# Patient Record
Sex: MALE | Race: WHITE | NOT HISPANIC OR LATINO | Employment: FULL TIME | URBAN - METROPOLITAN AREA
[De-identification: names, ages, dates, MRNs, and addresses within clinical notes are randomized per-mention and may not be internally consistent; named-entity substitution may affect disease eponyms.]

---

## 2023-08-05 ENCOUNTER — HOSPITAL ENCOUNTER (EMERGENCY)
Facility: HOSPITAL | Age: 53
Discharge: HOME | End: 2023-08-05
Attending: EMERGENCY MEDICINE
Payer: COMMERCIAL

## 2023-08-05 VITALS
SYSTOLIC BLOOD PRESSURE: 166 MMHG | OXYGEN SATURATION: 100 % | HEIGHT: 68 IN | HEART RATE: 80 BPM | DIASTOLIC BLOOD PRESSURE: 92 MMHG | WEIGHT: 242.95 LBS | TEMPERATURE: 98.2 F | RESPIRATION RATE: 16 BRPM | BODY MASS INDEX: 36.82 KG/M2

## 2023-08-05 DIAGNOSIS — I80.8 SUPERFICIAL THROMBOPHLEBITIS OF LEFT UPPER EXTREMITY: Primary | ICD-10-CM

## 2023-08-05 PROCEDURE — 99282 EMERGENCY DEPT VISIT SF MDM: CPT

## 2023-08-05 RX ORDER — ASPIRIN 325 MG
325 TABLET, DELAYED RELEASE (ENTERIC COATED) ORAL DAILY
COMMUNITY

## 2023-08-06 NOTE — DISCHARGE INSTRUCTIONS
Rest, drink plenty of fluids.  Warm compresses for 20 minutes several times a day.  Take a Aleve, naproxen or ibuprofen twice daily for the next 2 weeks.  You may also take over-the-counter acetaminophen as needed for pain.  Follow-up with your doctor next week for reevaluation and further treatment as necessary.  Return to the emergency department immediately for any significant increase in swelling, increasing redness, any warmth or fever or any new or worse concerns.

## 2023-08-06 NOTE — ED PROVIDER NOTES
Time: 9:15 PM EDT  Date of encounter:  8/5/2023  Independent Historian/Clinical History and Information was obtained by:   Patient    History is limited by: N/A    Chief Complaint: REDNESS TO LEFT FOREARM      History of Present Illness:      The patient presents to the emergency department with a small red area to his left forearm just distal to his elbow.  He states that he noticed it today after getting out of the shower.  The patient does have a very large tattoo with multiple colors covering it.  He states that he has a history of stroke in his had some superficial clots for concern that it might be a blood clot.  Cyst slightly tender and can be felt with palpation.  There is no significant warmth mild redness along the vein outline.  He is able to flex and extend the wrist and the elbow without any difficulties.  He is neurovascular intact.  He states there is really no pain associated with this.  He states only if you touch it is tender.  He reports no recent fevers.      History provided by:  Patient   used: No      Patient Care Team  Primary Care Provider: Guanaco Aragon MD    Past Medical History:     No Known Allergies  Past Medical History:   Diagnosis Date    GERD (gastroesophageal reflux disease)     Hyperlipidemia     Hypertension     Stroke      History reviewed. No pertinent surgical history.  History reviewed. No pertinent family history.    Home Medications:  Prior to Admission medications    Medication Sig Start Date End Date Taking? Authorizing Provider   aspirin 325 MG EC tablet Take 1 tablet by mouth Daily.    Geovani Gaming MD   ATORVASTATIN CALCIUM PO Take  by mouth.    Geovani Gaming MD   IRBESARTAN PO Take  by mouth.    Geovani Gaming MD   PANTOPRAZOLE SODIUM PO Take  by mouth.    Geovani Gaming MD        Social History:   Social History     Tobacco Use    Smoking status: Never    Smokeless tobacco: Never   Substance Use Topics    Alcohol  "use: Never         Review of Systems:  Review of Systems   Constitutional:  Negative for chills and fever.   HENT:  Negative for congestion, ear pain and sore throat.    Eyes:  Negative for pain.   Respiratory:  Negative for cough, chest tightness and shortness of breath.    Cardiovascular:  Negative for chest pain.   Gastrointestinal:  Negative for abdominal pain, diarrhea, nausea and vomiting.   Genitourinary:  Negative for flank pain and hematuria.   Musculoskeletal:  Positive for myalgias. Negative for back pain, gait problem, joint swelling, neck pain and neck stiffness.   Skin:  Positive for color change. Negative for pallor, rash and wound.   Neurological:  Negative for seizures and headaches.   All other systems reviewed and are negative.     Physical Exam:  /92   Pulse 80   Temp 98.2 øF (36.8 øC)   Resp 16   Ht 172.7 cm (68\")   Wt 110 kg (242 lb 15.2 oz)   SpO2 100%   BMI 36.94 kg/mý     Physical Exam  Vitals and nursing note reviewed.   Constitutional:       General: He is not in acute distress.     Appearance: Normal appearance. He is not ill-appearing or toxic-appearing.   HENT:      Head: Normocephalic and atraumatic.   Eyes:      General: No scleral icterus.     Conjunctiva/sclera: Conjunctivae normal.      Pupils: Pupils are equal, round, and reactive to light.   Cardiovascular:      Rate and Rhythm: Normal rate and regular rhythm.      Pulses: Normal pulses.   Pulmonary:      Effort: Pulmonary effort is normal. No respiratory distress.   Musculoskeletal:         General: Tenderness present. No swelling, deformity or signs of injury. Normal range of motion.      Cervical back: Normal range of motion.   Skin:     General: Skin is warm and dry.      Capillary Refill: Capillary refill takes less than 2 seconds.      Findings: Erythema present. No bruising, lesion or rash.   Neurological:      General: No focal deficit present.      Mental Status: He is alert and oriented to person, place, " and time. Mental status is at baseline.   Psychiatric:         Mood and Affect: Mood normal.         Behavior: Behavior normal.              Procedures:  Procedures      Medical Decision Making:      Comorbidities that affect care:    GERD, hyperlipidemia, stroke, Hypertension    External Notes reviewed:    None      The following orders were placed and all results were independently analyzed by me:  No orders of the defined types were placed in this encounter.      Medications Given in the Emergency Department:  Medications - No data to display     ED Course:         Labs:    Lab Results (last 24 hours)       ** No results found for the last 24 hours. **             Imaging:    No Radiology Exams Resulted Within Past 24 Hours      Differential Diagnosis and Discussion:    Extremity Pain: Differential diagnosis includes but is not limited to soft tissue sprain, tendonitis, tendon injury, dislocation, fracture, deep vein thrombosis, arterial insufficiency, osteoarthritis, bursitis, and ligamentous damage.      MDM  Number of Diagnoses or Management Options  Superficial thrombophlebitis of left upper extremity: new and requires workup  Risk of Complications, Morbidity, and/or Mortality  Presenting problems: low  Diagnostic procedures: low  Management options: low    Patient Progress  Patient progress: stable         Patient Care Considerations:    DUPLEX ULTRASOUND: I considered ordering a duplex ultrasound, however the patient is low risk for dvt and has no signs of arterial occlusion.      Consultants/Shared Management Plan:    None    Social Determinants of Health:    Patient is independent, reliable, and has access to care.       Disposition and Care Coordination:    Discharged: The patient is suitable and stable for discharge with no need for consideration of observation or admission.    I have explained the patient's condition, diagnoses and treatment plan based on the information available to me at this time. I  have answered questions and addressed any concerns. The patient has a good  understanding of the patient's diagnosis, condition, and treatment plan as can be expected at this point. The vital signs have been stable. The patient's condition is stable and appropriate for discharge from the emergency department.      The patient will pursue further outpatient evaluation with the primary care physician or other designated or consulting physician as outlined in the discharge instructions. They are agreeable to this plan of care and follow-up instructions have been explained in detail. The patient has received these instructions in written format and have expressed an understanding of the discharge instructions. The patient is aware that any significant change in condition or worsening of symptoms should prompt an immediate return to this or the closest emergency department or call to 1.  I have explained discharge medications and the need for follow up with the patient/caretakers. This was also printed in the discharge instructions. Patient was discharged with the following medications and follow up:      Medication List      No changes were made to your prescriptions during this visit.      Guanaco Aragon MD  [id number] [id number] [phone]    Call   FOR FOLLOW UP       Final diagnoses:   Superficial thrombophlebitis of left upper extremity        ED Disposition       ED Disposition   Discharge    Condition   Stable    Comment   --               This medical record created using voice recognition software.             Cherie Gilmore, JOLLY  08/05/23 [id number]